# Patient Record
Sex: MALE | Race: WHITE | URBAN - METROPOLITAN AREA
[De-identification: names, ages, dates, MRNs, and addresses within clinical notes are randomized per-mention and may not be internally consistent; named-entity substitution may affect disease eponyms.]

---

## 2017-01-04 ENCOUNTER — OFFICE VISIT (OUTPATIENT)
Dept: PSYCHOLOGY | Facility: CLINIC | Age: 22
End: 2017-01-04
Payer: COMMERCIAL

## 2017-01-04 DIAGNOSIS — F32.0 MAJOR DEPRESSIVE DISORDER, SINGLE EPISODE, MILD (H): Primary | ICD-10-CM

## 2017-01-04 PROCEDURE — 90834 PSYTX W PT 45 MINUTES: CPT | Performed by: PSYCHOLOGIST

## 2017-01-04 NOTE — PROGRESS NOTES
Progress Note    Client Name: Luis Alberto Viera  Date: 1/4/2017       Service Type: Individual      Session Start Time: 11:20  Session End Time: 12:15      Session Length: 55 min     Session #: 4     Attendees: Client attended alone    Treatment Plan Last Reviewed: 12/1/16  PHQ-9 / TALITA-7: next session     DATA      Progress Since Last Session (Related to Symptoms / Goals / Homework):   Symptoms: Improved    Homework: Partially completed      Episode of Care Goals: Minimal progress - PREPARATION (Decided to change - considering how); Intervened by negotiating a change plan and determining options / strategies for behavior change, identifying triggers, exploring social supports, and working towards setting a date to begin behavior change     Current / Ongoing Stressors and Concerns:   Ongoing depression - anxious when he considers change.  Is nervous about new job at [employer] and making a comic book   Hx of suicidal thoughts - no urges and can seek help as needed     Treatment Objective(s) Addressed in This Session:   use distraction each time intrusive worry surfaces  Getting in touch with his decision making process     Intervention:   CBT: Look at triggers for anxiety  Rewiring the brain. changing the mood by emotions                ASSESSMENT: Current Emotional / Mental Status (status of significant symptoms):              Risk status (Self / Other harm or suicidal ideation)  Client has had a history of suicidal ideation: thinking it is not worth it but clearly said he would not do that. He knows his family loves him and agreed to calling for help if it gets worse.He does talk to his family when down.   Client denies current fears or concerns for personal safety.  Client denies current or recent suicidal ideation or behaviors.  Client denies current or recent homicidal ideation or behaviors.  Client denies current or recent self injurious behavior or ideation.  Client denies  other safety concerns.  Client reports there are firearms in the house. The firearms are secured in a locked space.  A safety and risk management plan has been developed including: Client consented to co-developed safety plan, which includes   A safety and risk management plan has been developed including: Collaborative care team was informed of client's risk status and plan.  he was referred to crisis on call                Appearance:                           Appropriate  shaven              Eye Contact:                           Good               Psychomotor Behavior:          Normal  Retarded (Slowed)               Attitude:                                   Cooperative  stuck               Orientation:                             All              Speech                          Rate / Production:       Normal                           Volume:                       Normal               Mood:                                      Anxious  feels stuck              Affect:                                      Appropriate  Blunted  Worrisome               Thought Content:                    Clear  Rumination               Thought Form:                        Coherent  Logical  low motivation              Insight:                                    Good                 Medication Review:              No current psychiatric medications prescribed                Medication Compliance:              NA                Changes in Health Issues:              None reported                Chemical Use Review:              Substance Use: Chemical use reviewed, no active concerns identified                 Tobacco Use: No current tobacco use.                  Collateral Reports Completed:              Not Applicable    PLAN: (Client Tasks / Therapist Tasks / Other)  Notice what is a yes and no. Interview for [employer].  Do daily personal mantra. Notice what is going well. Discuss Shame hesitancy and Brene' Brown. Goal:  building a life he enjoys. Continue doing daily meditations. Allow self to write afterwards. Consider walking and dictating. Do a meditation in session focusing of self awareness, then CBT.    Kallie Lowery LP    ________________________________________________________________________    Treatment Plan    Client's Name: Luis Alberto Viera              YOB: 1995    Date: 12/1/2016    DSM-V Diagnoses: 296.22 - Major Depressive Disorder, Single Episode, Moderate By History; 799.9 - Deferred Diagnosis  Psychosocial / Contextual Factors: isolation, low finances  WHODAS: 21 at [facility]    Referral / Collaboration:  Referral to another professional/service is not indicated at this time..    Anticipated number of session or this episode of care: 12      MeasurableTreatment Goal(s) related to diagnosis / functional impairment(s)  Goal 1: Client will engage in life more and feel less depressed               I will know I've met my goal when I am happy and my life doesn't feel pointless.      Objective #A (Client Action)                Client will Increase interest, engagement, and pleasure in doing things  Decrease frequency and intensity of feeling down, depressed, hopeless  Feel less tired and more energy during the day    Identify negative self-talk and behaviors: challenge core beliefs, myths, and actions  Improve concentration, focus, and mindfulness in daily activities .  Status: New - Date: 12/1/16     Intervention(s)  Therapist will teach 3 Good things and depression management and meditations.    Objective #B  Client will Decrease thoughts that you'd be better off dead or of suicide / self-harm.  Status: New - Date: 12/1/16      Intervention(s)  Therapist will make referrals to crisis and CBT to increase positive self talk.    Objective #C  Client will progress with life opportunities.  Status: New - Date: 12/1/16      Intervention(s)  Therapist will provide motivation for behavioral activation and follow  through.    Client has reviewed and agreed to the above plan.               December 1, 2016    Kallie Lowery LP  January 4, 2017

## 2017-01-18 ENCOUNTER — OFFICE VISIT (OUTPATIENT)
Dept: PSYCHOLOGY | Facility: CLINIC | Age: 22
End: 2017-01-18
Payer: COMMERCIAL

## 2017-01-18 DIAGNOSIS — F32.0 MAJOR DEPRESSIVE DISORDER, SINGLE EPISODE, MILD (H): Primary | ICD-10-CM

## 2017-01-18 PROCEDURE — 90834 PSYTX W PT 45 MINUTES: CPT | Performed by: PSYCHOLOGIST

## 2017-01-18 ASSESSMENT — ANXIETY QUESTIONNAIRES
1. FEELING NERVOUS, ANXIOUS, OR ON EDGE: NEARLY EVERY DAY
3. WORRYING TOO MUCH ABOUT DIFFERENT THINGS: MORE THAN HALF THE DAYS
GAD7 TOTAL SCORE: 13
5. BEING SO RESTLESS THAT IT IS HARD TO SIT STILL: SEVERAL DAYS
IF YOU CHECKED OFF ANY PROBLEMS ON THIS QUESTIONNAIRE, HOW DIFFICULT HAVE THESE PROBLEMS MADE IT FOR YOU TO DO YOUR WORK, TAKE CARE OF THINGS AT HOME, OR GET ALONG WITH OTHER PEOPLE: SOMEWHAT DIFFICULT
7. FEELING AFRAID AS IF SOMETHING AWFUL MIGHT HAPPEN: SEVERAL DAYS
2. NOT BEING ABLE TO STOP OR CONTROL WORRYING: MORE THAN HALF THE DAYS
6. BECOMING EASILY ANNOYED OR IRRITABLE: NEARLY EVERY DAY

## 2017-01-18 ASSESSMENT — PATIENT HEALTH QUESTIONNAIRE - PHQ9: 5. POOR APPETITE OR OVEREATING: SEVERAL DAYS

## 2017-01-18 NOTE — PROGRESS NOTES
Progress Note    Client Name: Luis Alberto Viera  Date: 1/18/2017       Service Type: Individual      Session Start Time: 11:33  Session End Time: 12:53      Session Length: 50 min     Session #: 5     Attendees: Client attended alone    Treatment Plan Last Reviewed: 12/1/16  PHQ-9 / TALITA-7: today     DATA      Progress Since Last Session (Related to Symptoms / Goals / Homework):   Symptoms: Improved    Homework: Partially completed      Episode of Care Goals: Satisfactory progress - ACTION (Actively working towards change); Intervened by reinforcing change plan / affirming steps taken     Current / Ongoing Stressors and Concerns:   Ongoing depression - anxious when he considers change.  Is nervous about job interview tomorrow at [employer] and making a comic book   Hx of suicidal thoughts - no urges and can seek help as needed     Treatment Objective(s) Addressed in This Session:   identify 2 strategies to more effectively address stressors  identify three distraction and diversion activities and use those activities to decrease level of anxiety    Increase interest, engagement, and pleasure in doing things  Decrease frequency and intensity of feeling down, depressed, hopeless  Identify negative self-talk and behaviors: challenge core beliefs, myths, and actions  identify every moment positives concerning self-esteem each session of therapy  Positive thinking and the art of receiving.     Intervention:   CBT: positive focus for job interview.  DBT: mindfulness of receiving the gifts of life   Learning and processing education - Luis Alberto is auditory, kinesthetic and visual              ASSESSMENT: Current Emotional / Mental Status (status of significant symptoms):              Risk status (Self / Other harm or suicidal ideation)  Client has had a history of suicidal ideation: thinking it is not worth it but clearly said he would not do that. He knows his family loves him and agreed to  calling for help if it gets worse.He does talk to his family when down. Fleeting thoughts he distracts from.  Client denies current fears or concerns for personal safety.  Client denies current or recent suicidal ideation or behaviors.  Client denies current or recent homicidal ideation or behaviors.  Client denies current or recent self injurious behavior or ideation.  Client denies other safety concerns.  Client reports there are firearms in the house. The firearms are secured in a locked space.  A safety and risk management plan has been developed including: Client consented to co-developed safety plan, which includes   A safety and risk management plan has been developed including: Collaborative care team was informed of client's risk status and plan.  he was referred to crisis on call                Appearance:                           Appropriate  shaven              Eye Contact:                           Good               Psychomotor Behavior:          Normal               Attitude:                                   Cooperative  stuck               Orientation:                             All              Speech                          Rate / Production:       Normal                           Volume:                       Normal               Mood:                                      Anxious  feels less stuck              Affect:                                      Appropriate Worrisome               Thought Content:                    Clear  Rumination               Thought Form:                        Coherent  Logical  low motivation              Insight:                                    Good                 Medication Review:              No current psychiatric medications prescribed                Medication Compliance:              NA                Changes in Health Issues:              None reported                Chemical Use Review:              Substance Use: Chemical use reviewed, no active  concerns identified                 Tobacco Use: No current tobacco use.                  Collateral Reports Completed:              Not Applicable    PLAN: (Client Tasks / Therapist Tasks / Other)  Be mindful and see the gifts of each moment. Use movement to be creative. Notice what is a yes and no. Interview for [employer].  Do daily personal mantra. Notice what is going well. Discuss Shame hesitancy and Brene' Brown. Goal: building a life he enjoys. Continue doing daily meditations. Allow self to write afterwards. Consider walking and dictating. Do a meditation in session focusing of self awareness, then CBT. Call for help if suicidal thoughts persist.    Kallie Lowery, LP    ________________________________________________________________________    Treatment Plan    Client's Name: Luis Alberto Viera              YOB: 1995    Date: 12/1/2016    DSM-V Diagnoses: 296.22 - Major Depressive Disorder, Single Episode, Moderate By History; 799.9 - Deferred Diagnosis  Psychosocial / Contextual Factors: isolation, low finances  WHODAS: 21 at [facility]    Referral / Collaboration:  Referral to another professional/service is not indicated at this time..    Anticipated number of session or this episode of care: 12      MeasurableTreatment Goal(s) related to diagnosis / functional impairment(s)  Goal 1: Client will engage in life more and feel less depressed               I will know I've met my goal when I am happy and my life doesn't feel pointless.      Objective #A (Client Action)                Client will Increase interest, engagement, and pleasure in doing things  Decrease frequency and intensity of feeling down, depressed, hopeless  Feel less tired and more energy during the day    Identify negative self-talk and behaviors: challenge core beliefs, myths, and actions  Improve concentration, focus, and mindfulness in daily activities .  Status: New - Date: 12/1/16     Intervention(s)  Therapist will teach 3 Good  things and depression management and meditations.    Objective #B  Client will Decrease thoughts that you'd be better off dead or of suicide / self-harm.  Status: New - Date: 12/1/16      Intervention(s)  Therapist will make referrals to crisis and CBT to increase positive self talk.    Objective #C  Client will progress with life opportunities.  Status: New - Date: 12/1/16      Intervention(s)  Therapist will provide motivation for behavioral activation and follow through.    Client has reviewed and agreed to the above plan.               December 1, 2016    Kallie Lowery LP  January 18, 2017

## 2017-01-19 ASSESSMENT — PATIENT HEALTH QUESTIONNAIRE - PHQ9: SUM OF ALL RESPONSES TO PHQ QUESTIONS 1-9: 18

## 2017-01-19 ASSESSMENT — ANXIETY QUESTIONNAIRES: GAD7 TOTAL SCORE: 13

## 2017-02-15 ENCOUNTER — OFFICE VISIT (OUTPATIENT)
Dept: PSYCHOLOGY | Facility: CLINIC | Age: 22
End: 2017-02-15
Payer: COMMERCIAL

## 2017-02-15 DIAGNOSIS — F32.0 MAJOR DEPRESSIVE DISORDER, SINGLE EPISODE, MILD (H): ICD-10-CM

## 2017-02-15 DIAGNOSIS — F41.9 MILD ANXIETY: Primary | ICD-10-CM

## 2017-02-15 PROCEDURE — 90834 PSYTX W PT 45 MINUTES: CPT | Performed by: PSYCHOLOGIST

## 2017-02-15 ASSESSMENT — ANXIETY QUESTIONNAIRES
3. WORRYING TOO MUCH ABOUT DIFFERENT THINGS: MORE THAN HALF THE DAYS
IF YOU CHECKED OFF ANY PROBLEMS ON THIS QUESTIONNAIRE, HOW DIFFICULT HAVE THESE PROBLEMS MADE IT FOR YOU TO DO YOUR WORK, TAKE CARE OF THINGS AT HOME, OR GET ALONG WITH OTHER PEOPLE: SOMEWHAT DIFFICULT
GAD7 TOTAL SCORE: 11
6. BECOMING EASILY ANNOYED OR IRRITABLE: NEARLY EVERY DAY
2. NOT BEING ABLE TO STOP OR CONTROL WORRYING: SEVERAL DAYS
7. FEELING AFRAID AS IF SOMETHING AWFUL MIGHT HAPPEN: SEVERAL DAYS
5. BEING SO RESTLESS THAT IT IS HARD TO SIT STILL: NOT AT ALL
1. FEELING NERVOUS, ANXIOUS, OR ON EDGE: NEARLY EVERY DAY

## 2017-02-15 ASSESSMENT — PATIENT HEALTH QUESTIONNAIRE - PHQ9: 5. POOR APPETITE OR OVEREATING: SEVERAL DAYS

## 2017-02-15 NOTE — PROGRESS NOTES
"                                           Progress Note    Client Name: Luis Alberto Viera  Date: 2/15/2017       Service Type: Individual      Session Start Time: 11:30  Session End Time: 12:20      Session Length: 50 min     Session #: 6     Attendees: Client attended alone    Treatment Plan Last Reviewed: 12/1/16  PHQ-9 / TALITA-7: 1/18/17     DATA      Progress Since Last Session (Related to Symptoms / Goals / Homework):   Symptoms: about the same    Homework: Partially completed      Episode of Care Goals: Minimal progress - ACTION (Actively working towards change); Intervened by reinforcing change plan / affirming steps taken     Current / Ongoing Stressors and Concerns:   Ongoing depression - anxious when he considers change.    Did his job interview at [employer] and hasn't heard back. Was offered  fo a week this summer. Plans that he and friend will make a comic book   Hx of suicidal thoughts - no urges and can seek help as needed     Treatment Objective(s) Addressed in This Session:   use thought-stopping strategy daily to reduce intrusive thoughts  Increase interest, engagement, and pleasure in doing things  Decrease frequency and intensity of feeling down, depressed, hopeless  Feel less tired and more energy during the day   Anxious \"terrified\" about driving     Intervention:   CBT: Addressing anxious thoughts  Interpersonal Therapy: to follow through with his job interview  Guided medication to ground and calm driving anxiety - new goal         ASSESSMENT: Current Emotional / Mental Status (status of significant symptoms):              Risk status (Self / Other harm or suicidal ideation)  Client has had a history of suicidal ideation: thinking it is not worth it but clearly said he would not do that. He knows his family loves him and agreed to calling for help if it gets worse.He does talk to his family when down. Fleeting thoughts he distracts from.  Client denies current fears or concerns for " personal safety.  Client denies current or recent suicidal ideation or behaviors.  Client denies current or recent homicidal ideation or behaviors.  Client denies current or recent self injurious behavior or ideation.  Client denies other safety concerns.  Client reports there are firearms in the house. The firearms are secured in a locked space.  A safety and risk management plan has been developed including: Client consented to co-developed safety plan, which includes   A safety and risk management plan has been developed including: Collaborative care team was informed of client's risk status and plan.  he was referred to crisis on call                 Appearance:                           Appropriate  shaven              Eye Contact:                           Good               Psychomotor Behavior:          Normal               Attitude:                                   Cooperative  stuck               Orientation:                             All              Speech                          Rate / Production:       Normal                           Volume:                       Normal               Mood:                                      Anxious  feels less stuck              Affect:                                      Appropriate Worrisome               Thought Content:                    Clear  Rumination               Thought Form:                        Coherent  Logical  low motivation              Insight:                                    Good                  Medication Review:              No current psychiatric medications prescribed                 Medication Compliance:              NA                 Changes in Health Issues:              None reported                 Chemical Use Review:              Substance Use: Chemical use reviewed, no active concerns identified                  Tobacco Use: No current tobacco use.                   Collateral Reports Completed:              Not  Applicable     PLAN: (Client Tasks / Therapist Tasks / Other)  Do guided driving meditation daily.  Use movement to be creative. Notice what is a yes and no. Call [employer].  Do daily personal mantra. Notice what is going well. Discuss Shame hesitancy and Brene' Brown. Goal: building a life he enjoys. Continue doing daily meditations. Allow self to write afterwards. Consider walking and dictating. Do CBT. Call for help if suicidal thoughts persist.     Kallie Lowery, LP     ________________________________________________________________________     Treatment Plan     Client's Name: Luis Alberto Viera              YOB: 1995     Date: 12/1/2016     DSM-V Diagnoses: 296.22 - Major Depressive Disorder, Single Episode, Moderate By History; 799.9 - Deferred Diagnosis  Psychosocial / Contextual Factors: isolation, low finances  WHODAS: 21 at [facility]     Referral / Collaboration:  Referral to another professional/service is not indicated at this time..     Anticipated number of session or this episode of care: 12        MeasurableTreatment Goal(s) related to diagnosis / functional impairment(s)  Goal 1: Client will engage in life more and feel less depressed               I will know I've met my goal when I am happy and my life doesn't feel pointless.       Objective #A (Client Action)                Client will Increase interest, engagement, and pleasure in doing things  Decrease frequency and intensity of feeling down, depressed, hopeless  Feel less tired and more energy during the day    Identify negative self-talk and behaviors: challenge core beliefs, myths, and actions  Improve concentration, focus, and mindfulness in daily activities .  Status: New - Date: 12/1/16      Intervention(s)  Therapist will teach 3 Good things and depression management and meditations.     Objective #B  Client will Decrease thoughts that you'd be better off dead or of suicide / self-harm.  Status: New - Date: 12/1/16        Intervention(s)  Therapist will make referrals to crisis and CBT to increase positive self talk.     Objective #C  Client will progress with life opportunities.  Status: New - Date: 12/1/16       Intervention(s)  Therapist will provide motivation for behavioral activation and follow through.    Goal 2: Client will get his 's license and calm his anxiety     I will know I've met my goal when he can drive.      Objective #A (Client Action)    Client will use relaxation strategies 2 times per day to reduce the physical symptoms of anxiety.  Status: New - Date: 2/15/17     Intervention(s)  Therapist will provide guided meditation and continue anxiety management techniques.     Client has reviewed and agreed to the above plan.               December 1, 2016    Kallie Lowery LP  February 15, 2017

## 2017-02-15 NOTE — MR AVS SNAPSHOT
"                  MRN:[id number]                      After Visit Summary   2/15/2017    Luis Alberto Viera    MRN: [id number]           Visit Information        Provider Department      2/15/2017 11:30 AM Kallie Lowery MARKIMAN [facility]      Your next 10 appointments already scheduled     2017  2:00 PM CST   Return Visit with Kallie Lowery IMAN   [facility] ([facility])    [address] [place] [id number] [phone]            Mar 20, 2017  2:00 PM CDT   Return Visit with Kallie Lowery IMAN   [facility] ([address] [phone]              MyChart Information     [employer] lets you send messages to your doctor, view your test results, renew your prescriptions, schedule appointments and more. To sign up, go to [email or web address] . Click on \"Log in\" on the left side of the screen, which will take you to the Welcome page. Then click on \"Sign up Now\" on the right side of the page.     You will be asked to enter the access code listed below, as well as some personal information. Please follow the directions to create your username and password.     Your access code is: [email or web address]  Expires: 2017 12:56 PM     Your access code will  in 90 days. If you need help or a new code, please call your [place] clinic or [phone].        Care EveryWhere ID     This is your Care EveryWhere ID. This could be used by other organizations to access your [place] medical records  [id number]        "

## 2017-02-16 ASSESSMENT — PATIENT HEALTH QUESTIONNAIRE - PHQ9: SUM OF ALL RESPONSES TO PHQ QUESTIONS 1-9: 17

## 2017-02-16 ASSESSMENT — ANXIETY QUESTIONNAIRES: GAD7 TOTAL SCORE: 11

## 2017-03-20 ENCOUNTER — OFFICE VISIT (OUTPATIENT)
Dept: PSYCHOLOGY | Facility: CLINIC | Age: 22
End: 2017-03-20
Payer: COMMERCIAL

## 2017-03-20 DIAGNOSIS — F41.1 GENERALIZED ANXIETY DISORDER: ICD-10-CM

## 2017-03-20 DIAGNOSIS — F32.0 MAJOR DEPRESSIVE DISORDER, SINGLE EPISODE, MILD (H): Primary | ICD-10-CM

## 2017-03-20 PROCEDURE — 90834 PSYTX W PT 45 MINUTES: CPT | Performed by: PSYCHOLOGIST

## 2017-03-20 ASSESSMENT — ANXIETY QUESTIONNAIRES
GAD7 TOTAL SCORE: 14
7. FEELING AFRAID AS IF SOMETHING AWFUL MIGHT HAPPEN: SEVERAL DAYS
5. BEING SO RESTLESS THAT IT IS HARD TO SIT STILL: SEVERAL DAYS
IF YOU CHECKED OFF ANY PROBLEMS ON THIS QUESTIONNAIRE, HOW DIFFICULT HAVE THESE PROBLEMS MADE IT FOR YOU TO DO YOUR WORK, TAKE CARE OF THINGS AT HOME, OR GET ALONG WITH OTHER PEOPLE: SOMEWHAT DIFFICULT
2. NOT BEING ABLE TO STOP OR CONTROL WORRYING: SEVERAL DAYS
3. WORRYING TOO MUCH ABOUT DIFFERENT THINGS: NEARLY EVERY DAY
1. FEELING NERVOUS, ANXIOUS, OR ON EDGE: NEARLY EVERY DAY
6. BECOMING EASILY ANNOYED OR IRRITABLE: NEARLY EVERY DAY

## 2017-03-20 ASSESSMENT — PATIENT HEALTH QUESTIONNAIRE - PHQ9: 5. POOR APPETITE OR OVEREATING: MORE THAN HALF THE DAYS

## 2017-03-20 NOTE — PROGRESS NOTES
Progress Note    Client Name: Luis Alberto Viera  Date: 3/20/2017       Service Type: Individual      Session Start Time: 2:00  Session End Time: 2:50      Session Length: 50min     Session #: 7     Attendees: Client attended alone    Treatment Plan Last Reviewed: today  PHQ-9 / TALITA-7: today     DATA      Progress Since Last Session (Related to Symptoms / Goals / Homework):   Symptoms: not much change    Homework: Partially completed      Episode of Care Goals: Minimal progress - ACTION (Actively working towards change); Intervened by reinforcing change plan / affirming steps taken     Current / Ongoing Stressors and Concerns:   Ongoing depression - anxious when he considers change.    Did his job interview at [employer] and hasn't heard back. Was offered  fo a week this summer. Plans that he and friend will make a comic book   Hx of suicidal thoughts - no urges and can seek help as needed     Treatment Objective(s) Addressed in This Session:   use thought-stopping strategy daily to reduce intrusive thoughts  Increase interest, engagement, and pleasure in doing things  Decrease frequency and intensity of feeling down, depressed, hopeless  Improve quantity and quality of night time sleep / decrease daytime naps  Decrease thoughts about death - fears, not plans.  Driving anxiety - he is listening to the meditation recording daily.     Intervention:   CBT: to challenge anxiety thoughts  Motivational Interviewing: to begin driving in spite fo fears   Safety plan agreed he would never harm himself. He would ask for help         ASSESSMENT: Current Emotional / Mental Status (status of significant symptoms):              Risk status (Self / Other harm or suicidal ideation)  Client has had a history of suicidal ideation: thinking it is not worth it but clearly said he would not do that. He knows his family loves him and agreed to calling for help if it gets worse.He does talk  to his family when down. Fleeting thoughts he distracts from.  Client denies current fears or concerns for personal safety.  Client denies current or recent suicidal ideation or behaviors.  Client denies current or recent homicidal ideation or behaviors.  Client denies current or recent self injurious behavior or ideation.  Client denies other safety concerns.  Client reports there are firearms in the house. The firearms are secured in a locked space.  A safety and risk management plan has been developed including: Client consented to co-developed safety plan, which includes   A safety and risk management plan has been developed including: Collaborative care team was informed of client's risk status and plan.  he was referred to crisis on call                  Appearance:                           Appropriate                Eye Contact:                           Good               Psychomotor Behavior:          Normal               Attitude:                                   Cooperative  stuck               Orientation:                             All              Speech                          Rate / Production:       Normal                           Volume:                       Normal               Mood:                                      Anxious  feels less stuck              Affect:                                      Appropriate Worrisome               Thought Content:                    Clear  Rumination               Thought Form:                        Coherent  Logical  low motivation              Insight:                                    Good                   Medication Review:              No current psychiatric medications prescribed                  Medication Compliance:              NA                  Changes in Health Issues:              None reported                  Chemical Use Review:              Substance Use: Chemical use reviewed, no active concerns identified                    Tobacco Use: No current tobacco use.                    Collateral Reports Completed:              Not Applicable      PLAN: (Client Tasks / Therapist Tasks / Other)  Practice driving. Do guided driving meditation daily.  Use movement to be creative.  Do daily personal mantra. Notice what is going well. Discuss Shame hesitancy and Brene' Brown. Goal: building a life he enjoys. Continue doing daily meditations. Allow self to write afterwards. Consider walking and dictating. Do CBT. Call for help if suicidal thoughts return.      Kallie Lowery, LP      ________________________________________________________________________      Treatment Plan      Client's Name: Luis Alberto Viera              YOB: 1995      Date: 12/1/2016, Reviewed 3/20/2017        DSM-V Diagnoses: 296.22 - Major Depressive Disorder, Single Episode, Moderate By History; 799.9 - Deferred Diagnosis  Generalized anxiety  Psychosocial / Contextual Factors: isolation, low finances  WHODAS: 21 at [facility]      Referral / Collaboration:  Referral to another professional/service is not indicated at this time..      Anticipated number of session or this episode of care: 12          MeasurableTreatment Goal(s) related to diagnosis / functional impairment(s)  Goal 1: Client will engage in life more and feel less depressed               I will know I've met my goal when I am happy and my life doesn't feel pointless.        Objective #A (Client Action)                Client will Increase interest, engagement, and pleasure in doing things  Decrease frequency and intensity of feeling down, depressed, hopeless  Feel less tired and more energy during the day    Identify negative self-talk and behaviors: challenge core beliefs, myths, and actions  Improve concentration, focus, and mindfulness in daily activities .  Status: New - Date: 12/1/16, Reviewed 3/20/2017       Intervention(s)  Therapist will teach 3 Good things and depression  management and meditations. Self talk changes      Objective #B  Client will Decrease thoughts that you'd be better off dead or of suicide / self-harm.  Status: New - Date: 12/1/16, Reviewed 3/20/2017        Intervention(s)  Therapist will make referrals to crisis and CBT to increase positive self talk. Develop positive interactions      Objective #C  Client will progress with life opportunities.  Status: New - Date: 12/1/16, Reviewed 3/20/2017         Intervention(s)  Therapist will provide motivation for behavioral activation and follow through.     Goal 2: Client will get his 's license and calm his anxiety   I will know I've met my goal when he can drive.       Objective #A (Client Action)    Client will use relaxation strategies 2 times per day to reduce the physical symptoms of anxiety.  Status: New - Date: 2/15/17, Reviewed 3/20/2017       Intervention(s)  Therapist will provide guided meditation and continue anxiety management techniques.      Client has reviewed and agreed to the above plan.               December 1, 2016    Kallie Lowery LP  March 20, 2017

## 2017-03-20 NOTE — MR AVS SNAPSHOT
"                  MRN:[id number]                      After Visit Summary   3/20/2017    Luis Alberto Viera    MRN: [id number]           Visit Information        Provider Department      3/20/2017 2:00 PM Kallie Lowery, IMAN [facility] Generic      Your next 10 appointments already scheduled     2017  2:00 PM CDT   Return Visit with Kallie Aguilamary graceIMAN   [facility] ([facility]    [address] [place] [id number]   [phone]            2017  2:00 PM CDT   Return Visit with Kallie Aguilamary graceIMAN   [facility] ([facility]    [address] [place] [id number]   [phone]            May 04, 2017  2:00 PM CDT   Return Visit with Kallie Lowery LP   [facility] ([facility]    [address] [place] [id number] [phone]              MyChart Information     [email or web address] lets you send messages to your doctor, view your test results, renew your prescriptions, schedule appointments and more. To sign up, go to [email or web address] . Click on \"Log in\" on the left side of the screen, which will take you to the Welcome page. Then click on \"Sign up Now\" on the right side of the page.     You will be asked to enter the access code listed below, as well as some personal information. Please follow the directions to create your username and password.     Your access code is: [email or web address]  Expires: 2017  1:56 PM     Your access code will  in 90 days. If you need help or a new code, please call your [place] clinic or [phone].        Care EveryWhere ID     This is your Care EveryWhere ID. This could be used by other organizations to access your [place] medical records  [id number]        "

## 2017-03-21 ASSESSMENT — PATIENT HEALTH QUESTIONNAIRE - PHQ9: SUM OF ALL RESPONSES TO PHQ QUESTIONS 1-9: 18

## 2017-03-21 ASSESSMENT — ANXIETY QUESTIONNAIRES: GAD7 TOTAL SCORE: 14

## 2017-04-06 ENCOUNTER — OFFICE VISIT (OUTPATIENT)
Dept: PSYCHOLOGY | Facility: CLINIC | Age: 22
End: 2017-04-06
Payer: COMMERCIAL

## 2017-04-06 DIAGNOSIS — F41.1 GENERALIZED ANXIETY DISORDER: ICD-10-CM

## 2017-04-06 DIAGNOSIS — F32.0 MAJOR DEPRESSIVE DISORDER, SINGLE EPISODE, MILD (H): Primary | ICD-10-CM

## 2017-04-06 PROCEDURE — 90834 PSYTX W PT 45 MINUTES: CPT | Performed by: PSYCHOLOGIST

## 2017-04-06 NOTE — MR AVS SNAPSHOT
"                  MRN:[id number]                      After Visit Summary   2017    Luis Alberto Viera    MRN: [id number]           Visit Information        Provider Department      2017 2:00 PM Kallie Lowery MARK, IMAN [facility] Generic      Your next 10 appointments already scheduled     2017  2:00 PM CDT   Return Visit with Kallie Lowery IMAN   [facility] ([facility])    [address] [place] [id number] [phone]            May 04, 2017  2:00 PM CDT   Return Visit with Kallie Lowery IMAN   [facility] ([address] [phone] [phone]              MyChart Information     [employer] lets you send messages to your doctor, view your test results, renew your prescriptions, schedule appointments and more. To sign up, go to [email or web address] . Click on \"Log in\" on the left side of the screen, which will take you to the Welcome page. Then click on \"Sign up Now\" on the right side of the page.     You will be asked to enter the access code listed below, as well as some personal information. Please follow the directions to create your username and password.     Your access code is: [email or web address]  Expires: 2017  1:56 PM     Your access code will  in 90 days. If you need help or a new code, please call your [place] clinic or [phone].        Care EveryWhere ID     This is your Care EveryWhere ID. This could be used by other organizations to access your [place] medical records  [id number]        "

## 2017-04-06 NOTE — PROGRESS NOTES
Progress Note    Client Name: Luis Alberto Viera  Date: 4/6/2017       Service Type: Individual      Session Start Time: 2:00  Session End Time: 2:50      Session Length: 50 min     Session #: 8     Attendees: Client attended alone    Treatment Plan Last Reviewed: 3/20/17  PHQ-9 / TALITA-7: 3/20/17     DATA      Progress Since Last Session (Related to Symptoms / Goals / Homework):   Symptoms: Improved Luis Alberto says that he feels like he likes things and can consider a class because he wants to learn writing and drawing for comic books. He has begun writing.    Homework: Partially completed      Episode of Care Goals: Satisfactory progress - ACTION (Actively working towards change); Intervened by reinforcing change plan / affirming steps taken     Current / Ongoing Stressors and Concerns:   Ongoing depression - anxious when he considers change.    Has a job interview at [employer] today and is more hopeful. Was offered  fo a week this summer. Plans that he and friend will make a comic book   Hx of suicidal thoughts - no urges and can seek help as needed     Treatment Objective(s) Addressed in This Session:   use thought-stopping strategy daily to reduce intrusive thoughts  Increase interest, engagement, and pleasure in doing things  Decrease frequency and intensity of feeling down, depressed, hopeless  Identify negative self-talk and behaviors: challenge core beliefs, myths, and actions  No suicidal thoughts for weeks.     Intervention:   CBT: letting go of old beliefs  Psychodynamic: Validation and removing defenses  Solution Focused: to take a class and go to job interview. Referral to [employer].     Safety plan agreed he would never harm himself. He would ask for help          ASSESSMENT: Current Emotional / Mental Status (status of significant symptoms):              Risk status (Self / Other harm or suicidal ideation)  Client has had a history of suicidal ideation:  thinking it is not worth it but clearly said he would not do that. He knows his family loves him and agreed to calling for help if it gets worse.He does talk to his family when down. Fleeting thoughts he distracts from.  Client denies current fears or concerns for personal safety.  Client denies current or recent suicidal ideation or behaviors.  Client denies current or recent homicidal ideation or behaviors.  Client denies current or recent self injurious behavior or ideation.  Client denies other safety concerns.  Client reports there are firearms in the house. The firearms are secured in a locked space.  A safety and risk management plan has been developed including: Client consented to co-developed safety plan, which includes   A safety and risk management plan has been developed including: Collaborative care team was informed of client's risk status and plan.  he was referred to crisis on call                  Appearance:                           Appropriate                Eye Contact:                           Good               Psychomotor Behavior:          Normal               Attitude:                                   Cooperative  stuck               Orientation:                             All              Speech                          Rate / Production:       Normal                           Volume:                       Normal               Mood:                                      Hopeful  feels less stuck              Affect:                                      Appropriate Worrisome               Thought Content:                    Clear                Thought Form:                        Coherent  Logical                Insight:                                    Good                   Medication Review:              No current psychiatric medications prescribed                  Medication Compliance:              NA                  Changes in Health Issues:              None  reported                  Chemical Use Review:              Substance Use: Chemical use reviewed, no active concerns identified                   Tobacco Use: No current tobacco use.                    Collateral Reports Completed:              Not Applicable      PLAN: (Client Tasks / Therapist Tasks / Other)  Go to job interview ans continue writing. Practice driving. Do guided driving meditation daily. Use movement to be creative. Do daily personal mantra. Notice what is going well. Discuss Shame hesitancy and Brene' Brown. Goal: building a life he enjoys.  Call for help if suicidal thoughts return.      Kallie Lowery, LP      ________________________________________________________________________      Treatment Plan      Client's Name: Luis Alberto Viera              YOB: 1995      Date: 12/1/2016, Reviewed 3/20/2017         DSM-V Diagnoses: 296.22 - Major Depressive Disorder, Single Episode, Moderate By History; 799.9 - Deferred Diagnosis  Generalized anxiety  Psychosocial / Contextual Factors: isolation, low finances  WHODAS: 21 at [employer] [employer]      Referral / Collaboration:  Referral to another professional/service is not indicated at this time..      Anticipated number of session or this episode of care: 12          MeasurableTreatment Goal(s) related to diagnosis / functional impairment(s)  Goal 1: Client will engage in life more and feel less depressed               I will know I've met my goal when I am happy and my life doesn't feel pointless.        Objective #A (Client Action)                Client will Increase interest, engagement, and pleasure in doing things  Decrease frequency and intensity of feeling down, depressed, hopeless  Feel less tired and more energy during the day    Identify negative self-talk and behaviors: challenge core beliefs, myths, and actions  Improve concentration, focus, and mindfulness in daily activities .  Status: New - Date: 12/1/16, Reviewed 3/20/2017        Intervention(s)  Therapist will teach 3 Good things and depression management and meditations. Self talk changes      Objective #B  Client will Decrease thoughts that you'd be better off dead or of suicide / self-harm.  Status: New - Date: 12/1/16, Reviewed 3/20/2017        Intervention(s)  Therapist will make referrals to crisis and CBT to increase positive self talk. Develop positive interactions      Objective #C  Client will progress with life opportunities.  Status: New - Date: 12/1/16, Reviewed 3/20/2017         Intervention(s)  Therapist will provide motivation for behavioral activation and follow through.      Goal 2: Client will get his 's license and calm his anxiety   I will know I've met my goal when he can drive.        Objective #A (Client Action)   Client will use relaxation strategies 2 times per day to reduce the physical symptoms of anxiety.  Status: New - Date: 2/15/17, Reviewed 3/20/2017        Intervention(s)  Therapist will provide guided meditation and continue anxiety management techniques.      Client has reviewed and agreed to the above plan.        March 20, 2017    Kallie Lowery LP  April 6, 2017

## 2017-05-04 ENCOUNTER — OFFICE VISIT (OUTPATIENT)
Dept: PSYCHOLOGY | Facility: CLINIC | Age: 22
End: 2017-05-04
Payer: COMMERCIAL

## 2017-05-04 DIAGNOSIS — F41.1 GENERALIZED ANXIETY DISORDER: Primary | ICD-10-CM

## 2017-05-04 PROCEDURE — 90834 PSYTX W PT 45 MINUTES: CPT | Performed by: PSYCHOLOGIST

## 2017-05-04 ASSESSMENT — ANXIETY QUESTIONNAIRES
1. FEELING NERVOUS, ANXIOUS, OR ON EDGE: MORE THAN HALF THE DAYS
IF YOU CHECKED OFF ANY PROBLEMS ON THIS QUESTIONNAIRE, HOW DIFFICULT HAVE THESE PROBLEMS MADE IT FOR YOU TO DO YOUR WORK, TAKE CARE OF THINGS AT HOME, OR GET ALONG WITH OTHER PEOPLE: NOT DIFFICULT AT ALL
7. FEELING AFRAID AS IF SOMETHING AWFUL MIGHT HAPPEN: NOT AT ALL
2. NOT BEING ABLE TO STOP OR CONTROL WORRYING: SEVERAL DAYS
3. WORRYING TOO MUCH ABOUT DIFFERENT THINGS: SEVERAL DAYS
GAD7 TOTAL SCORE: 9
5. BEING SO RESTLESS THAT IT IS HARD TO SIT STILL: MORE THAN HALF THE DAYS
6. BECOMING EASILY ANNOYED OR IRRITABLE: MORE THAN HALF THE DAYS

## 2017-05-04 ASSESSMENT — PATIENT HEALTH QUESTIONNAIRE - PHQ9: 5. POOR APPETITE OR OVEREATING: SEVERAL DAYS

## 2017-05-04 NOTE — PROGRESS NOTES
Progress Note    Client Name: Luis Alberto Viera  Date: 5/4/2017       Service Type: Individual      Session Start Time: 2:00  Session End Time: 2:50      Session Length:  50 min     Session #: 9     Attendees: Client attended alone    Treatment Plan Last Reviewed: 3/20/17  PHQ-9 / TALITA-7: today     DATA      Progress Since Last Session (Related to Symptoms / Goals / Homework):   Symptoms: Improved    Homework: Partially completed, he is writing, driving and called the [employer].       Episode of Care Goals: Satisfactory progress - ACTION (Actively working towards change); Intervened by reinforcing change plan / affirming steps taken     Current / Ongoing Stressors and Concerns:   Ongoing depression - anxious when he considers change.    Has a job interview at [employer] today and is more hopeful. Was offered  fo a week this summer. Plans that he and friend will make a comic book   Hx of suicidal thoughts - no urges and can seek help as needed     Treatment Objective(s) Addressed in This Session:   use relaxation strategies 10 times per day to reduce the physical symptoms of anxiety  identify 2 positives concerning self-esteem each session of therapy  Driving anxiety  Career development - writing     Intervention:   CBT: to be more confident with driving.  schhol and writing plans. Did relaxation exercise.       Safety plan agreed he would never harm himself. He would ask for help - no current thoughts          ASSESSMENT: Current Emotional / Mental Status (status of significant symptoms):              Risk status (Self / Other harm or suicidal ideation)  Client has had a history of suicidal ideation: thinking it is not worth it but clearly said he would not do that. He knows his family loves him and agreed to calling for help if it gets worse.He does talk to his family when down. Fleeting thoughts he distracts from.  Client denies current fears or concerns for  personal safety.  Client denies current or recent suicidal ideation or behaviors.  Client denies current or recent homicidal ideation or behaviors.  Client denies current or recent self injurious behavior or ideation.  Client denies other safety concerns.  Client reports there are firearms in the house. The firearms are secured in a locked space.  A safety and risk management plan has been developed including: Client consented to co-developed safety plan, which includes   A safety and risk management plan has been developed including: Collaborative care team was informed of client's risk status and plan.  he was referred to crisis on call                  Appearance:                           Appropriate                Eye Contact:                           Good               Psychomotor Behavior:          Normal               Attitude:                                   Cooperative  stuck               Orientation:                             All              Speech                          Rate / Production:       Normal                           Volume:                       Normal               Mood:                                      Hopeful  feels less stuck              Affect:                                      Appropriate Worrisome               Thought Content:                    Clear                Thought Form:                        Coherent  Logical                Insight:                                    Good                   Medication Review:              No current psychiatric medications prescribed                  Medication Compliance:              NA                  Changes in Health Issues:              None reported                  Chemical Use Review:              Substance Use: Chemical use reviewed, no active concerns identified                   Tobacco Use: No current tobacco use.                    Collateral Reports Completed:              Not Applicable      PLAN: (Client  Tasks / Therapist Tasks / Other)  Set up job as . Continue writing. Practice driving. Contact school  for writing class in the fall.  Do guided driving meditation daily. Use movement to be creative. Do daily personal mantra. Notice what is going well. Discuss Shame hesitancy and Brene' Brown. Goal: building a life he enjoys. Call for help if suicidal thoughts return.      Kallie MARKAshanti Lowery, LP      ________________________________________________________________________      Treatment Plan      Client's Name: Luis Alberto Viera              YOB: 1995      Date: 12/1/2016, Reviewed 3/20/2017          DSM-V Diagnoses: 296.22 - Major Depressive Disorder, Single Episode, Moderate By History; 799.9 - Deferred Diagnosis  Generalized anxiety  Psychosocial / Contextual Factors: isolation, low finances  WHODAS: 21 at [facility] [employer]      Referral / Collaboration:  Referral to another professional/service is not indicated at this time..      Anticipated number of session or this episode of care: 12          MeasurableTreatment Goal(s) related to diagnosis / functional impairment(s)  Goal 1: Client will engage in life more and feel less depressed               I will know I've met my goal when I am happy and my life doesn't feel pointless.        Objective #A (Client Action)                Client will Increase interest, engagement, and pleasure in doing things  Decrease frequency and intensity of feeling down, depressed, hopeless  Feel less tired and more energy during the day    Identify negative self-talk and behaviors: challenge core beliefs, myths, and actions  Improve concentration, focus, and mindfulness in daily activities .  Status: New - Date: 12/1/16, Reviewed 3/20/2017       Intervention(s)  Therapist will teach 3 Good things and depression management and meditations. Self talk changes      Objective #B  Client will Decrease thoughts that you'd be better off dead or of suicide / self-harm.  Status: New -  Date: 12/1/16, Reviewed 3/20/2017        Intervention(s)  Therapist will make referrals to crisis and CBT to increase positive self talk. Develop positive interactions      Objective #C  Client will progress with life opportunities.  Status: New - Date: 12/1/16, Reviewed 3/20/2017         Intervention(s)  Therapist will provide motivation for behavioral activation and follow through.      Goal 2: Client will get his 's license and calm his anxiety   I will know I've met my goal when he can drive.        Objective #A (Client Action)   Client will use relaxation strategies 2 times per day to reduce the physical symptoms of anxiety.  Status: New - Date: 2/15/17, Reviewed 3/20/2017        Intervention(s)  Therapist will provide guided meditation and continue anxiety management techniques.      Client has reviewed and agreed to the above plan.       March 20, 2017    Kallie Lowery LP  May 4, 2017

## 2017-05-04 NOTE — MR AVS SNAPSHOT
"                  MRN:[id number]                      After Visit Summary   2017    Luis Alberto Viera    MRN: [id number]           Visit Information        Provider Department      2017 2:00 PM Kallie Lowery LP [facility]        Your next 10 appointments already scheduled     2017  1:00 PM CDT   Return Visit with Kallie Lowery LP   [facility] ([facility])    [address] [place] [id number] [phone]              MyChart Information     Amanda Huff DBA SecuRecoveryt lets you send messages to your doctor, view your test results, renew your prescriptions, schedule appointments and more. To sign up, go to [email or web address] . Click on \"Log in\" on the left side of the screen, which will take you to the Welcome page. Then click on \"Sign up Now\" on the right side of the page.     You will be asked to enter the access code listed below, as well as some personal information. Please follow the directions to create your username and password.     Your access code is: [email or web address]  Expires: 2017  1:56 PM     Your access code will  in 90 days. If you need help or a new code, please call your [place] clinic or [phone].        Care EveryWhere ID     This is your Care EveryWhere ID. This could be used by other organizations to access your [place] medical records  [id number]        "

## 2017-05-05 ASSESSMENT — PATIENT HEALTH QUESTIONNAIRE - PHQ9: SUM OF ALL RESPONSES TO PHQ QUESTIONS 1-9: 11

## 2017-05-05 ASSESSMENT — ANXIETY QUESTIONNAIRES: GAD7 TOTAL SCORE: 9

## 2017-05-19 ENCOUNTER — TELEPHONE (OUTPATIENT)
Dept: FAMILY MEDICINE | Facility: CLINIC | Age: 22
End: 2017-05-19

## 2017-05-19 NOTE — TELEPHONE ENCOUNTER
What type of form?  LEADER FORM  What day did you drop off your forms? Friday, MAY 19  Is there a due date? ASAP (7-10 business days to compete forms)   How would you like to receive these forms? Please mail to:  JENNIFER LONDON  [address] [place] [address]    What is the best number to contact you? Home [phone]   What time works best to contact you with in 4 hrs? ANYTIME  Is it okay to leave a message? yes  Katalina Maddox (Auto signs name of person logged into Epic)

## 2017-05-21 ENCOUNTER — RADIANT APPOINTMENT (OUTPATIENT)
Dept: GENERAL RADIOLOGY | Facility: CLINIC | Age: 22
End: 2017-05-21
Attending: PHYSICIAN ASSISTANT
Payer: COMMERCIAL

## 2017-05-21 ENCOUNTER — OFFICE VISIT (OUTPATIENT)
Dept: URGENT CARE | Facility: URGENT CARE | Age: 22
End: 2017-05-21
Payer: COMMERCIAL

## 2017-05-21 VITALS
DIASTOLIC BLOOD PRESSURE: 59 MMHG | HEART RATE: 114 BPM | SYSTOLIC BLOOD PRESSURE: 109 MMHG | OXYGEN SATURATION: 94 % | BODY MASS INDEX: 29.36 KG/M2 | WEIGHT: 215 LBS | TEMPERATURE: 97.6 F

## 2017-05-21 DIAGNOSIS — M25.571 ACUTE RIGHT ANKLE PAIN: ICD-10-CM

## 2017-05-21 DIAGNOSIS — M25.571 ACUTE RIGHT ANKLE PAIN: Primary | ICD-10-CM

## 2017-05-21 PROCEDURE — 73610 X-RAY EXAM OF ANKLE: CPT | Mod: RT

## 2017-05-21 PROCEDURE — 99213 OFFICE O/P EST LOW 20 MIN: CPT | Performed by: PHYSICIAN ASSISTANT

## 2017-05-21 ASSESSMENT — ENCOUNTER SYMPTOMS
DIARRHEA: 0
ABDOMINAL PAIN: 0
MYALGIAS: 0
COUGH: 0
SORE THROAT: 0
FEVER: 0
VOMITING: 0
EYE REDNESS: 0
PALPITATIONS: 0
HEADACHES: 0
NAUSEA: 0
SHORTNESS OF BREATH: 0
WHEEZING: 0
BLURRED VISION: 0
CHILLS: 0
EYE DISCHARGE: 0

## 2017-05-21 ASSESSMENT — PAIN SCALES - GENERAL: PAINLEVEL: EXTREME PAIN (8)

## 2017-05-21 NOTE — NURSING NOTE
"Chief Complaint   Patient presents with     Trauma     Pt c/o right ankle injury.        Initial /59 (BP Location: Left arm, Patient Position: Chair, Cuff Size: Adult Large)  Pulse 114  Temp 97.6  F (36.4  C) (Oral)  Wt 215 lb (97.5 kg)  SpO2 94%  BMI 29.36 kg/m2 Estimated body mass index is 29.36 kg/(m^2) as calculated from the following:    Height as of 10/24/16: 5' 11.75\" (1.822 m).    Weight as of this encounter: 215 lb (97.5 kg).  Medication Reconciliation: complete     Mary Kay Martino CMA (AAMA)      "

## 2017-05-21 NOTE — PROGRESS NOTES
SUBJECTIVE:                                                    Luis Alberto Viera is a 21 year old male who presents to clinic today for the following health issues:      Musculoskeletal problem/pain      Duration: about 4 hours ago    Description  Location: right ankle, feels like he cannot bear weight.     Intensity:  8/10    Accompanying signs and symptoms: numbness and tingling    History  Previous similar problem: no   Previous evaluation:  none    Precipitating or alleviating factors:  Trauma or overuse: YES- pt states that he was walking and he twisted his ankle in a pothole.   Aggravating factors include: standing, walking and climbing stairs    Therapies tried and outcome: nothing           Problem list and histories reviewed & adjusted, as indicated.  Additional history: as documented    Patient Active Problem List   Diagnosis     CARDIOVASCULAR SCREENING; LDL GOAL LESS THAN 160     Major depressive disorder, single episode, mild (H)     Generalized anxiety disorder     Past Surgical History:   Procedure Laterality Date     CIRCUMCISION,CLAMP       SOFT TISSUE SURGERY         Social History   Substance Use Topics     Smoking status: Never Smoker     Smokeless tobacco: Never Used      Comment: father smokes outside      Alcohol use No     Family History   Problem Relation Age of Onset     Asthma No family hx of      DIABETES Paternal Grandfather      C.A.D. No family hx of      Hypertension No family hx of      CEREBROVASCULAR DISEASE No family hx of      Prostate Cancer Paternal Grandfather      70     CANCER Maternal Grandfather          Current Outpatient Prescriptions   Medication Sig Dispense Refill     order for DME Ankle splint 1 Device 0     NO ACTIVE MEDICATIONS        No Known Allergies  Labs reviewed in EPIC    Reviewed and updated as needed this visit by clinical staff  Tobacco  Allergies  Meds  Problems       Reviewed and updated as needed this visit by Provider  Allergies  Meds  Problems          ROS:  Review of Systems   Constitutional: Negative for chills, fever and malaise/fatigue.   HENT: Negative for congestion, ear pain and sore throat.    Eyes: Negative for blurred vision, discharge and redness.   Respiratory: Negative for cough, shortness of breath and wheezing.    Cardiovascular: Negative for chest pain and palpitations.   Gastrointestinal: Negative for abdominal pain, diarrhea, nausea and vomiting.   Musculoskeletal: Positive for joint pain. Negative for myalgias.   Skin: Negative for rash.   Neurological: Negative for headaches.         OBJECTIVE:                                                    /59 (BP Location: Left arm, Patient Position: Chair, Cuff Size: Adult Large)  Pulse 114  Temp 97.6  F (36.4  C) (Oral)  Wt 215 lb (97.5 kg)  SpO2 94%  BMI 29.36 kg/m2  Body mass index is 29.36 kg/(m^2).  Physical Exam   Constitutional:   No acute distress but appears uncomfortable   Musculoskeletal:   Right ankle with obvious deformity, erythema, or ecchymosis. There is mild swelling. Diffuse tenderness with palpation and limited active ROM due to pain. Palpable dorsalis pedis pulse and lower extremity CMS intact.   Neurological:   No focal deficits. Symmetric strength.    Psychiatric:   Alert and cooperative       Diagnostic Test Results:  Xray - right ankle no acute findings     ASSESSMENT/PLAN:                                                      1. Acute right ankle pain    - XR Ankle Right G/E 3 Views; Future  - order for DME; Ankle splint  Dispense: 1 Device; Refill: 0       Patient Instructions   No fracture seen on x-ray. Will fit with ankle splint for support. Recommend rest, ice, and ibuprofen as needed for pain and swelling. Advance activity as tolerated. Return to clinic if symptoms worsen or are not improving; otherwise follow up as needed       Liset Russo PA-C  [facility]

## 2017-05-21 NOTE — PATIENT INSTRUCTIONS
No fracture seen on x-ray. Will fit with ankle splint for support. Recommend rest, ice, and ibuprofen as needed for pain and swelling. Advance activity as tolerated. Return to clinic if symptoms worsen or are not improving; otherwise follow up as needed

## 2017-05-21 NOTE — MR AVS SNAPSHOT
After Visit Summary   5/21/2017    Luis Alberto Viera    MRN: [id number]           Patient Information     Date Of Birth          1995        Visit Information        Provider Department      5/21/2017 12:55 PM Liset Russo PA-C [facility]        Today's Diagnoses     Acute right ankle pain    -  1      Care Instructions    No fracture seen on x-ray. Will fit with ankle splint for support. Recommend rest, ice, and ibuprofen as needed for pain and swelling. Advance activity as tolerated. Return to clinic if symptoms worsen or are not improving; otherwise follow up as needed         Follow-ups after your visit        Follow-up notes from your care team     Return if symptoms worsen or fail to improve.      Your next 10 appointments already scheduled     Jun 12, 2017  1:00 PM CDT   Return Visit with Kallie Lowery LP   [facility] ([facility])    [address] [place] [id number] [phone]              Who to contact     If you have questions or need follow up information about today's clinic visit or your schedule please contact [facility] directly at [phone].  Normal or non-critical lab and imaging results will be communicated to you by MyChart, letter or phone within 4 business days after the clinic has received the results. If you do not hear from us within 7 days, please contact the clinic through MyChart or phone. If you have a critical or abnormal lab result, we will notify you by phone as soon as possible.  Submit refill requests through ElationEMR or call your pharmacy and they will forward the refill request to us. Please allow 3 business days for your refill to be completed.          Additional Information About Your Visit        MyChart Information     ElationEMR lets you send messages to your doctor, view your test results, renew your prescriptions, schedule appointments and more. To sign up, go to  [email or web address] . Click on \"Log in\" on the left side of the screen, which will take you to the Welcome page. Then click on \"Sign up Now\" on the right side of the page.     You will be asked to enter the access code listed below, as well as some personal information. Please follow the directions to create your username and password.     Your access code is: [email or web address]  Expires: 2017  1:45 PM     Your access code will  in 90 days. If you need help or a new code, please call your [place] clinic or [phone].        Care EveryWhere ID     This is your Care EveryWhere ID. This could be used by other organizations to access your [place] medical records  [id number]        Your Vitals Were     Pulse Temperature Pulse Oximetry BMI (Body Mass Index)          114 97.6  F (36.4  C) (Oral) 94% 29.36 kg/m2         Blood Pressure from Last 3 Encounters:   17 109/59   10/24/16 120/82   10/19/15 122/78    Weight from Last 3 Encounters:   17 215 lb (97.5 kg)   10/24/16 216 lb 8 oz (98.2 kg)   10/19/15 205 lb 3.2 oz (93.1 kg)                 Today's Medication Changes          These changes are accurate as of: 17  1:48 PM.  If you have any questions, ask your nurse or doctor.               Start taking these medicines.        Dose/Directions    order for DME   Used for:  Acute right ankle pain   Started by:  Liset Russo PA-C        Ankle splint   Quantity:  1 Device   Refills:  0            Where to get your medicines      Some of these will need a paper prescription and others can be bought over the counter.  Ask your nurse if you have questions.     Bring a paper prescription for each of these medications     order for DME                Primary Care Provider Office Phone # Fax #    Karyna Holly [phone]       [address] [address]        Thank you!     Thank you for choosing [facility]  for your care. Our goal is always to provide you with excellent care. Hearing back from our patients is one way we can continue to improve our services. Please take a few minutes to complete the written survey that you may receive in the mail after your visit with us. Thank you!             Your Updated Medication List - Protect others around you: Learn how to safely use, store and throw away your medicines at www.disposemymeds.org.          This list is accurate as of: 5/21/17  1:48 PM.  Always use your most recent med list.                   Brand Name Dispense Instructions for use    NO ACTIVE MEDICATIONS          order for DME     1 Device    Ankle splint

## 2017-09-10 ENCOUNTER — HEALTH MAINTENANCE LETTER (OUTPATIENT)
Age: 22
End: 2017-09-10

## 2017-10-25 ENCOUNTER — OFFICE VISIT (OUTPATIENT)
Dept: FAMILY MEDICINE | Facility: CLINIC | Age: 22
End: 2017-10-25
Payer: COMMERCIAL

## 2017-10-25 VITALS
WEIGHT: 217.6 LBS | HEART RATE: 64 BPM | OXYGEN SATURATION: 99 % | BODY MASS INDEX: 29.47 KG/M2 | DIASTOLIC BLOOD PRESSURE: 86 MMHG | SYSTOLIC BLOOD PRESSURE: 116 MMHG | TEMPERATURE: 98 F | HEIGHT: 72 IN | RESPIRATION RATE: 16 BRPM

## 2017-10-25 DIAGNOSIS — Z00.00 ROUTINE GENERAL MEDICAL EXAMINATION AT A HEALTH CARE FACILITY: Primary | ICD-10-CM

## 2017-10-25 DIAGNOSIS — Z23 NEED FOR TDAP VACCINATION: ICD-10-CM

## 2017-10-25 PROCEDURE — 99395 PREV VISIT EST AGE 18-39: CPT | Mod: 25 | Performed by: FAMILY MEDICINE

## 2017-10-25 PROCEDURE — 90471 IMMUNIZATION ADMIN: CPT | Performed by: FAMILY MEDICINE

## 2017-10-25 PROCEDURE — 90715 TDAP VACCINE 7 YRS/> IM: CPT | Performed by: FAMILY MEDICINE

## 2017-10-25 ASSESSMENT — PAIN SCALES - GENERAL: PAINLEVEL: NO PAIN (0)

## 2017-10-25 NOTE — NURSING NOTE
"Chief Complaint   Patient presents with     Physical     pt is fasting       Initial /86 (BP Location: Right arm, Patient Position: Right side, Cuff Size: Adult Regular)  Pulse 64  Temp 98  F (36.7  C) (Oral)  Resp 16  Ht 1.822 m (5' 11.75\")  Wt 98.7 kg (217 lb 9.6 oz)  SpO2 99%  BMI 29.72 kg/m2 Estimated body mass index is 29.72 kg/(m^2) as calculated from the following:    Height as of this encounter: 1.822 m (5' 11.75\").    Weight as of this encounter: 98.7 kg (217 lb 9.6 oz).  Medication Reconciliation: completecomplete  Óscar Gray MA      "

## 2017-10-25 NOTE — MR AVS SNAPSHOT
After Visit Summary   10/25/2017    Luis Alberto Viera    MRN: [id number]           Patient Information     Date Of Birth          1995        Visit Information        Provider Department      10/25/2017 9:40 AM Karyna Holly MD [facility]        Today's Diagnoses     Routine general medical examination at a health care facility    -  1      Care Instructions      Preventive Health Recommendations  Male Ages 18 - 25     Yearly exam:             See your health care provider every year in order to  o   Review health changes.   o   Discuss preventive care.    o   Review your medicines if your doctor has prescribed any.    You should be tested each year for STDs (sexually transmitted diseases).     Talk to your provider about cholesterol testing.      If you are at risk for diabetes, you should have a diabetes test (fasting glucose).    Shots: Get a flu shot each year. Get a tetanus shot every 10 years.     Nutrition:    Eat at least 5 servings of fruits and vegetables daily.     Eat whole-grain bread, whole-wheat pasta and brown rice instead of white grains and rice.     Talk to your provider about calcium and Vitamin D.     Lifestyle    Exercise for at least 150 minutes a week (30 minutes a day, 5 days a week). This will help you control your weight and prevent disease.     Limit alcohol to one drink per day.     No smoking.     Wear sunscreen to prevent skin cancer.     See your dentist every six months for an exam and cleaning.             Follow-ups after your visit        Follow-up notes from your care team     Return in about 1 year (around 10/25/2018).      Who to contact     If you have questions or need follow up information about today's clinic visit or your schedule please contact [facility] directly at [phone].  Normal or non-critical lab and imaging results will be communicated to you by MyChart, letter or phone within 4 business days after the  "clinic has received the results. If you do not hear from us within 7 days, please contact the clinic through Friendster or phone. If you have a critical or abnormal lab result, we will notify you by phone as soon as possible.  Submit refill requests through Friendster or call your pharmacy and they will forward the refill request to us. Please allow 3 business days for your refill to be completed.          Additional Information About Your Visit        excentosharFanBread Information     Friendster lets you send messages to your doctor, view your test results, renew your prescriptions, schedule appointments and more. To sign up, go to [email or web address] . Click on \"Log in\" on the left side of the screen, which will take you to the Welcome page. Then click on \"Sign up Now\" on the right side of the page.     You will be asked to enter the access code listed below, as well as some personal information. Please follow the directions to create your username and password.     Your access code is: [email or web address]  Expires: 2018 10:32 AM     Your access code will  in 90 days. If you need help or a new code, please call your [place] clinic or [phone].        Care EveryWhere ID     This is your Care EveryWhere ID. This could be used by other organizations to access your [place] medical records  [id number]        Your Vitals Were     Pulse Temperature Respirations Height Pulse Oximetry BMI (Body Mass Index)    64 98  F (36.7  C) (Oral) 16 1.822 m (5' 11.75\") 99% 29.72 kg/m2       Blood Pressure from Last 3 Encounters:   10/25/17 116/86   17 109/59   10/24/16 120/82    Weight from Last 3 Encounters:   10/25/17 98.7 kg (217 lb 9.6 oz)   17 97.5 kg (215 lb)   10/24/16 98.2 kg (216 lb 8 oz)              Today, you had the following     No orders found for display       Primary Care Provider Office Phone # Fax #    Karyna Roby Holly [phone] [address] [facility] [address]      "   Equal Access to Services     [facility] CenterSATHYA : Hadii aad ku hadchaibecky Jacobo, wajagdishda luqadaha, qalenorata azucenaaljanny miguel. So [facility] [phone].    ATENCIÓN: Si habla español, tiene a cervantes disposición servicios gratuitos de asistencia lingüística. Llame al [phone].    We comply with applicable federal civil rights laws and [place] laws. We do not discriminate on the basis of race, color, national origin, age, disability, sex, sexual orientation, or gender identity.            Thank you!     Thank you for choosing [facility]  for your care. Our goal is always to provide you with excellent care. Hearing back from our patients is one way we can continue to improve our services. Please take a few minutes to complete the written survey that you may receive in the mail after your visit with us. Thank you!             Your Updated Medication List - Protect others around you: Learn how to safely use, store and throw away your medicines at www.disposemymeds.org.          This list is accurate as of: 10/25/17 10:32 AM.  Always use your most recent med list.                   Brand Name Dispense Instructions for use Diagnosis    NO ACTIVE MEDICATIONS       Routine infant or child health check

## 2017-10-25 NOTE — PROGRESS NOTES
SUBJECTIVE:   CC: Luis Alberto Viera is an 22 year old male who presents for preventative health visit.     Healthy Habits:    Do you get at least three servings of calcium containing foods daily (dairy, green leafy vegetables, etc.)? yes    Amount of exercise or daily activities, outside of work: 2 day(s) per week    Problems taking medications regularly No    Medication side effects: No    Have you had an eye exam in the past two years? yes    Do you see a dentist twice per year? no  Do you have sleep apnea, excessive snoring or daytime drowsiness?no      Today's PHQ-2 Score:   PHQ-2 ( 1999 Pfizer) 10/24/2016 10/19/2015   Q1: Little interest or pleasure in doing things 3 0   Q2: Feeling down, depressed or hopeless 3 0   PHQ-2 Score 6 0         Abuse: Current or Past(Physical, Sexual or Emotional)- No  Do you feel safe in your environment - Yes  Social History   Substance Use Topics     Smoking status: Never Smoker     Smokeless tobacco: Never Used      Comment: father smokes outside      Alcohol use No     The patient does not drink >3 drinks per day nor >7 drinks per week.    Last PSA: No results found for: PSA    Reviewed orders with patient. Reviewed health maintenance and updated orders accordingly - Yes  Labs reviewed in EPIC  BP Readings from Last 3 Encounters:   10/25/17 116/86   05/21/17 109/59   10/24/16 120/82    Wt Readings from Last 3 Encounters:   10/25/17 98.7 kg (217 lb 9.6 oz)   05/21/17 97.5 kg (215 lb)   10/24/16 98.2 kg (216 lb 8 oz)                      Reviewed and updated as needed this visit by clinical staffTobacco  Allergies  Meds  Med Hx  Surg Hx  Fam Hx  Soc Hx        Reviewed and updated as needed this visit by Provider        Here for routine checkup  Doing well.  Reports no interval health concerns.        ROS:  C: NEGATIVE for fever, chills, change in weight  I: NEGATIVE for worrisome rashes, moles or lesions  E: NEGATIVE for vision changes or irritation  ENT: NEGATIVE for ear,  "mouth and throat problems  R: NEGATIVE for significant cough or SOB  CV: NEGATIVE for chest pain, palpitations or peripheral edema  GI: NEGATIVE for nausea, abdominal pain, heartburn, or change in bowel habits   male: negative for dysuria, hematuria, decreased urinary stream, erectile dysfunction, urethral discharge  M: NEGATIVE for significant arthralgias or myalgia  N: NEGATIVE for weakness, dizziness or paresthesias  P: NEGATIVE for changes in mood or affect    OBJECTIVE:   /86 (BP Location: Right arm, Patient Position: Right side, Cuff Size: Adult Regular)  Pulse 64  Temp 98  F (36.7  C) (Oral)  Resp 16  Ht 1.822 m (5' 11.75\")  Wt 98.7 kg (217 lb 9.6 oz)  SpO2 99%  BMI 29.72 kg/m2  EXAM:  GENERAL: healthy, alert and no distress  EYES: Eyes grossly normal to inspection, PERRL and conjunctivae and sclerae normal  HENT: ear canals and TM's normal, nose and mouth without ulcers or lesions  NECK: no adenopathy, no asymmetry, masses, or scars and thyroid normal to palpation  RESP: lungs clear to auscultation - no rales, rhonchi or wheezes  CV: regular rate and rhythm, normal S1 S2, no S3 or S4, no murmur, click or rub, no peripheral edema and peripheral pulses strong  ABDOMEN: soft, nontender, no hepatosplenomegaly, no masses and bowel sounds normal  MS: no gross musculoskeletal defects noted, no edema  SKIN: no suspicious lesions or rashes  NEURO: Normal strength and tone, mentation intact and speech normal  PSYCH: mentation appears normal, affect normal/bright    ASSESSMENT/PLAN:   1. Routine general medical examination at a health care facility  TdaP today - otherwise up to date       COUNSELING:  Reviewed preventive health counseling, as reflected in patient instructions       Regular exercise       Healthy diet/nutrition       Vision screening           reports that he has never smoked. He has never used smokeless tobacco.      Estimated body mass index is 29.72 kg/(m^2) as calculated from the " "following:    Height as of this encounter: 1.822 m (5' 11.75\").    Weight as of this encounter: 98.7 kg (217 lb 9.6 oz).         Counseling Resources:  ATP IV Guidelines  Pooled Cohorts Equation Calculator  FRAX Risk Assessment  ICSI Preventive Guidelines  Dietary Guidelines for Americans, 2010  USDA's MyPlate  ASA Prophylaxis  Lung CA Screening    Karyna Holly MD  [facility]  "

## 2017-12-26 ENCOUNTER — FCC EXTENDED DOCUMENTATION (OUTPATIENT)
Dept: PSYCHOLOGY | Facility: CLINIC | Age: 22
End: 2017-12-26

## 2017-12-26 DIAGNOSIS — F41.1 GENERALIZED ANXIETY DISORDER: Primary | ICD-10-CM

## 2017-12-26 NOTE — PROGRESS NOTES
Discharge Summary  Multiple Sessions    Client Name: Luis Alberto Viera MRN#: [id number] YOB: 1995      Intake / Discharge Date: 11/23/16 - 5/4/17  9 sessions    DSM5 Diagnoses:   Diagnoses: 296.22 - Major Depressive Disorder, Single Episode, Moderate By History; 799.9 - Deferred Diagnosis  Generalized anxiety  Psychosocial / Contextual Factors: isolation, low finances  WHODAS: 21 at tintake  Presenting Concern:  Client reports the reason for seeking therapy at this time as not being happy in life and feels like he is stuck.    Reason for Discharge:  Client is satisfied with progress and his schedule changed      Disposition at Time of Last Encounter:   Comments:   Has a job interview at [employer] today and is more hopeful. Was offered  fo a week this summer. Plans that he and friend will make a comic book   Hx of suicidal thoughts - no urges and can seek help as needed     Risk Management:   Client has had a history of suicidal ideation: thinking it is not worth it but clearly said he would not do that. He knows his family loves him and agreed to calling for help if it gets worse.He does talk to his family when down. Fleeting thoughts he distracts from.  Client denies current fears or concerns for personal safety.  Client denies current or recent suicidal ideation or behaviors.  Client denies current or recent homicidal ideation or behaviors.  Client denies current or recent self injurious behavior or ideation.  Client denies other safety concerns.  Client reports there are firearms in the house. The firearms are secured in a locked space.  A safety and risk management plan has been developed including: Client consented to co-developed safety plan, which includes   A safety and risk management plan has been developed including: Collaborative care team was informed of client's risk status and plan.  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 Continue writing. Practice driving. Contact school  for writing class in the fall.  Do guided driving meditation daily. Use movement to be creative. Do daily personal mantra. Notice what is going well.      Kallie Lowery LP   12/26/2017

## 2018-05-25 ENCOUNTER — TELEPHONE (OUTPATIENT)
Dept: FAMILY MEDICINE | Facility: CLINIC | Age: 23
End: 2018-05-25

## 2018-05-25 NOTE — TELEPHONE ENCOUNTER
What type of form? HEALTH  What day did you drop off your forms? 05/25/18  Is there a due date? 6//8/18 (7-10 business days to compete forms)   How would you like to receive these forms? Patient will  at the clinic when completed    What is the best number to contact you? Home [phone]  What time works best to contact you with in 4 hrs? ANY  Is it okay to leave a message? Yes    Janie Frey (Auto signs name of person logged into Epic)

## 2018-09-25 ENCOUNTER — OFFICE VISIT (OUTPATIENT)
Dept: FAMILY MEDICINE | Facility: CLINIC | Age: 23
End: 2018-09-25
Payer: COMMERCIAL

## 2018-09-25 VITALS
DIASTOLIC BLOOD PRESSURE: 80 MMHG | OXYGEN SATURATION: 98 % | HEIGHT: 72 IN | BODY MASS INDEX: 28.99 KG/M2 | HEART RATE: 89 BPM | SYSTOLIC BLOOD PRESSURE: 122 MMHG | TEMPERATURE: 98 F | WEIGHT: 214 LBS

## 2018-09-25 DIAGNOSIS — Z00.00 ROUTINE GENERAL MEDICAL EXAMINATION AT A HEALTH CARE FACILITY: Primary | ICD-10-CM

## 2018-09-25 PROCEDURE — 80061 LIPID PANEL: CPT | Performed by: FAMILY MEDICINE

## 2018-09-25 PROCEDURE — 82947 ASSAY GLUCOSE BLOOD QUANT: CPT | Performed by: FAMILY MEDICINE

## 2018-09-25 PROCEDURE — 99395 PREV VISIT EST AGE 18-39: CPT | Performed by: FAMILY MEDICINE

## 2018-09-25 PROCEDURE — 36415 COLL VENOUS BLD VENIPUNCTURE: CPT | Performed by: FAMILY MEDICINE

## 2018-09-25 ASSESSMENT — PAIN SCALES - GENERAL: PAINLEVEL: MILD PAIN (2)

## 2018-09-25 NOTE — MR AVS SNAPSHOT
After Visit Summary   9/25/2018    Luis Alberto Viera    MRN: [id number]           Patient Information     Date Of Birth          1995        Visit Information        Provider Department      9/25/2018 6:20 PM Karyna Holly MD [facility]        Today's Diagnoses     Routine general medical examination at a health care facility    -  1      Care Instructions      Preventive Health Recommendations  Male Ages 21 - 25     Yearly exam:             See your health care provider every year in order to  o   Review health changes.   o   Discuss preventive care.    o   Review your medicines if your doctor has prescribed any.    You should be tested each year for STDs (sexually transmitted diseases).     Talk to your provider about cholesterol testing.      If you are at risk for diabetes, you should have a diabetes test (fasting glucose).    Shots: Get a flu shot each year. Get a tetanus shot every 10 years.     Nutrition:    Eat at least 5 servings of fruits and vegetables daily.     Eat whole-grain bread, whole-wheat pasta and brown rice instead of white grains and rice.     Get adequate calcium and Vitamin D.     Lifestyle    Exercise for at least 150 minutes a week (30 minutes a day, 5 days a week). This will help you control your weight and prevent disease.     Limit alcohol to one drink per day.     No smoking.     Wear sunscreen to prevent skin cancer.     See your dentist every six months for an exam and cleaning.             Follow-ups after your visit        Follow-up notes from your care team     Return in about 1 year (around 9/25/2019) for Annual Checkup.      Who to contact     If you have questions or need follow up information about today's clinic visit or your schedule please contact [facility] directly at [phone].  Normal or non-critical lab and imaging results will be communicated to you by MyChart, letter or phone within 4 business days after the  "clinic has received the results. If you do not hear from us within 7 days, please contact the clinic through [employer] or phone. If you have a critical or abnormal lab result, we will notify you by phone as soon as possible.  Submit refill requests through [employer] or call your pharmacy and they will forward the refill request to us. Please allow 3 business days for your refill to be completed.          Additional Information About Your Visit        [employer] Information     [employer] lets you send messages to your doctor, view your test results, renew your prescriptions, schedule appointments and more. To sign up, go to [email or web address] . Click on \"Log in\" on the left side of the screen, which will take you to the Welcome page. Then click on \"Sign up Now\" on the right side of the page.     You will be asked to enter the access code listed below, as well as some personal information. Please follow the directions to create your username and password.     Your access code is: [email or web address]  Expires: 2018  6:31 PM     Your access code will  in 90 days. If you need help or a new code, please call your [facility] clinic or [phone].        Care EveryWhere ID     This is your Care EveryWhere ID. This could be used by other organizations to access your [facility] medical records  [id number]        Your Vitals Were     Pulse Temperature Height Pulse Oximetry BMI (Body Mass Index)       89 98  F (36.7  C) (Oral) 1.82 m (5' 11.65\") 98% 29.3 kg/m2        Blood Pressure from Last 3 Encounters:   18 122/80   10/25/17 116/86   17 109/59    Weight from Last 3 Encounters:   18 97.1 kg (214 lb)   10/25/17 98.7 kg (217 lb 9.6 oz)   17 97.5 kg (215 lb)              We Performed the Following     Glucose     Lipid panel reflex to direct LDL Non-fasting        Primary Care Provider Office Phone # Fax #    Karynabeau Holly [phone] [address] [facility] " [id number]        Equal Access to Services     [facility] RosaSATHYA : Hadii aad ku hadchaibecky Jacobo, wajagdishda luagnieszkabryanha, qalenoramarko zengfaustinojanny reddy. So [facility] [phone].    ATENCIÓN: Si habla español, tiene a cervantes disposición servicios gratuitos de asistencia lingüística. Llame al [phone].    We comply with applicable federal civil rights laws and [place] laws. We do not discriminate on the basis of race, color, national origin, age, disability, sex, sexual orientation, or gender identity.            Thank you!     Thank you for choosing [facility]  for your care. Our goal is always to provide you with excellent care. Hearing back from our patients is one way we can continue to improve our services. Please take a few minutes to complete the written survey that you may receive in the mail after your visit with us. Thank you!             Your Updated Medication List - Protect others around you: Learn how to safely use, store and throw away your medicines at www.disposemymeds.org.          This list is accurate as of 9/25/18  6:31 PM.  Always use your most recent med list.                   Brand Name Dispense Instructions for use Diagnosis    NO ACTIVE MEDICATIONS       Routine infant or child health check

## 2018-09-25 NOTE — LETTER
[address]  [address]  [phone]    October 1, 2018      Luis Alberto Viera  [address] [place] [id number]      Dear Luis Alberto,    Your lab work looks just fine.  Keep up the good work.       CAMERON Holly MD

## 2018-09-25 NOTE — PROGRESS NOTES
SUBJECTIVE:   CC: Luis Alberto Viera is an 23 year old male who presents for preventative health visit.     Healthy Habits:    Do you get at least three servings of calcium containing foods daily (dairy, green leafy vegetables, etc.)? yes    Amount of exercise or daily activities, outside of work: 1 day(s) per week    Problems taking medications regularly No    Medication side effects: No    Have you had an eye exam in the past two years? no    Do you see a dentist twice per year? no    Do you have sleep apnea, excessive snoring or daytime drowsiness?no           Today's PHQ-2 Score:   PHQ-2 ( 1999 Pfizer) 9/25/2018 10/24/2016   Q1: Little interest or pleasure in doing things 0 3   Q2: Feeling down, depressed or hopeless 0 3   PHQ-2 Score 0 6       Abuse: Current or Past(Physical, Sexual or Emotional)- No  Do you feel safe in your environment - Yes    Social History   Substance Use Topics     Smoking status: Never Smoker     Smokeless tobacco: Never Used      Comment: father smokes outside      Alcohol use No      If you drink alcohol do you typically have >3 drinks per day or >7 drinks per week? No                      Last PSA: No results found for: PSA    Reviewed orders with patient. Reviewed health maintenance and updated orders accordingly - Yes  Labs reviewed in EPIC  BP Readings from Last 3 Encounters:   09/25/18 122/80   10/25/17 116/86   05/21/17 109/59    Wt Readings from Last 3 Encounters:   09/25/18 97.1 kg (214 lb)   10/25/17 98.7 kg (217 lb 9.6 oz)   05/21/17 97.5 kg (215 lb)                    Reviewed and updated as needed this visit by clinical staff  Tobacco  Allergies  Meds         Reviewed and updated as needed this visit by Provider        Here today for routine checkup and some forms for biometric screening and scouting  Doing well.  Reports no interval health concerns.      ROS:  CONSTITUTIONAL: NEGATIVE for fever, chills, change in weight  INTEGUMENTARY/SKIN: NEGATIVE for worrisome rashes,  "moles or lesions  EYES: NEGATIVE for vision changes or irritation  ENT: NEGATIVE for ear, mouth and throat problems  RESP: NEGATIVE for significant cough or SOB  CV: NEGATIVE for chest pain, palpitations or peripheral edema  GI: NEGATIVE for nausea, abdominal pain, heartburn, or change in bowel habits   male: negative for dysuria, hematuria, decreased urinary stream, erectile dysfunction, urethral discharge  MUSCULOSKELETAL: NEGATIVE for significant arthralgias or myalgia  NEURO: NEGATIVE for weakness, dizziness or paresthesias  PSYCHIATRIC: NEGATIVE for changes in mood or affect    OBJECTIVE:   /80 (BP Location: Right arm, Patient Position: Chair, Cuff Size: Adult Large)  Pulse 89  Temp 98  F (36.7  C) (Oral)  Ht 1.82 m (5' 11.65\")  Wt 97.1 kg (214 lb)  SpO2 98%  BMI 29.3 kg/m2  EXAM:  GENERAL: healthy, alert and no distress  EYES: Eyes grossly normal to inspection, PERRL and conjunctivae and sclerae normal  HENT: ear canals and TM's normal, nose and mouth without ulcers or lesions  NECK: no adenopathy, no asymmetry, masses, or scars and thyroid normal to palpation  RESP: lungs clear to auscultation - no rales, rhonchi or wheezes  CV: regular rate and rhythm, normal S1 S2, no S3 or S4, no murmur, click or rub, no peripheral edema and peripheral pulses strong  ABDOMEN: soft, nontender, no hepatosplenomegaly, no masses and bowel sounds normal  MS: no gross musculoskeletal defects noted, no edema  SKIN: no suspicious lesions or rashes  NEURO: Normal strength and tone, mentation intact and speech normal  PSYCH: mentation appears normal, affect normal/bright    Diagnostic Test Results:  none     ASSESSMENT/PLAN:   1. Routine general medical examination at a health care facility  Health care maintenance up to date otherwise   - Glucose  - Lipid panel reflex to direct LDL Non-fasting    COUNSELING:  Reviewed preventive health counseling, as reflected in patient instructions       Regular exercise       " "Healthy diet/nutrition       Vision screening       Safe sex practices/STD prevention    BP Readings from Last 1 Encounters:   09/25/18 122/80     Estimated body mass index is 29.3 kg/(m^2) as calculated from the following:    Height as of this encounter: 1.82 m (5' 11.65\").    Weight as of this encounter: 97.1 kg (214 lb).           reports that he has never smoked. He has never used smokeless tobacco.      Counseling Resources:  ATP IV Guidelines  Pooled Cohorts Equation Calculator  FRAX Risk Assessment  ICSI Preventive Guidelines  Dietary Guidelines for Americans, 2010  USDA's MyPlate  ASA Prophylaxis  Lung CA Screening    Karyna Holly MD  [facility]  "

## 2018-09-26 LAB
CHOLEST SERPL-MCNC: 136 MG/DL
GLUCOSE SERPL-MCNC: 85 MG/DL (ref 70–99)
HDLC SERPL-MCNC: 39 MG/DL
LDLC SERPL CALC-MCNC: 87 MG/DL
NONHDLC SERPL-MCNC: 97 MG/DL
TRIGL SERPL-MCNC: 52 MG/DL

## 2018-10-01 NOTE — PROGRESS NOTES
Please mail results and note to patient:    Your lab work looks just fine.  Keep up the good work.  CAMERON Holly MD

## 2019-06-11 ENCOUNTER — TELEPHONE (OUTPATIENT)
Dept: FAMILY MEDICINE | Facility: CLINIC | Age: 24
End: 2019-06-11

## 2019-06-11 NOTE — TELEPHONE ENCOUNTER
Panel Management Review   One phone call and send letter if unable to reach them or Kleer[employer] message and send letter if not read after 2 weeks (You will get a message to your inbasket)      Visit date not found    Health Maintenance Due   Topic Date Due     DEPRESSION ACTION PLAN  1995     HIV SCREENING  09/25/2010     PHQ-9  11/04/2017        Fail List measure: Diabetes        Patient is due/failing the following:   A1C    Action needed:   Patient needs office visit for diabetic fu.    Type of outreach:    Sent letter.    Questions for provider review:    None                                              Ivette Tovar MA

## 2019-06-11 NOTE — LETTER
[address]  [address]  [phone]      June 11, 2019      Luis Alberto Viera  [address] [place] [address]            Dear Luis Alberto Viera:        Our records indicate that you are due for your Diabetic Follow-Up.     If you would like to schedule an appointment please call [phone] or schedule through [email or web address].     Thank you,     [facility]